# Patient Record
Sex: MALE | Race: WHITE | NOT HISPANIC OR LATINO | Employment: FULL TIME | URBAN - METROPOLITAN AREA
[De-identification: names, ages, dates, MRNs, and addresses within clinical notes are randomized per-mention and may not be internally consistent; named-entity substitution may affect disease eponyms.]

---

## 2021-03-04 NOTE — TELEPHONE ENCOUNTER
DIAGNOSIS: lesion in pelvis, per pt, Dr. Gisella Gay @[facility], imaging done @[facility], referral sent over already   APPOINTMENT DATE: 3.18.21   NOTES STATUS DETAILS   OFFICE NOTE from referring provider In process    OFFICE NOTE from other specialist N/A    DISCHARGE SUMMARY from hospital N/A    DISCHARGE REPORT from the ER N/A    OPERATIVE REPORT N/A    MEDICATION LIST Care Everywhere    EMG (for Spine) N/A    IMPLANT RECORD/STICKER N/A    LABS     CBC/DIFF Care Everywhere    CULTURES N/A    INJECTIONS DONE IN RADIOLOGY N/A    MRI Complete 2.18.21 pelvis   CT SCAN Complete 1.29.21 abd pelvis  1.23.21 abd pelvis   XRAYS (IMAGES & REPORTS) Complete 1.26.21 abd    TUMOR     PATHOLOGY  Slides & report N/A      Action 3.4.21 1:44 PM ADRIÁN   Action Taken Requested records from [facility] [phone]

## 2021-03-18 ENCOUNTER — PRE VISIT (OUTPATIENT)
Dept: ORTHOPEDICS | Facility: CLINIC | Age: 46
End: 2021-03-18

## 2021-03-18 ENCOUNTER — OFFICE VISIT (OUTPATIENT)
Dept: ORTHOPEDICS | Facility: CLINIC | Age: 46
End: 2021-03-18
Payer: COMMERCIAL

## 2021-03-18 VITALS — WEIGHT: 189 LBS | BODY MASS INDEX: 25.6 KG/M2 | HEIGHT: 72 IN

## 2021-03-18 DIAGNOSIS — M89.9 BONE LESION: Primary | ICD-10-CM

## 2021-03-18 PROCEDURE — 99204 OFFICE O/P NEW MOD 45 MIN: CPT | Performed by: ORTHOPAEDIC SURGERY

## 2021-03-18 ASSESSMENT — MIFFLIN-ST. JEOR: SCORE: 1774.81

## 2021-03-18 NOTE — NURSING NOTE
"Chief Complaint   Patient presents with     Consult     pt stated he has a cyst in his pelvis. Dr Gisella Gay referred.        45 year old  1975    Ht 1.82 m (5' 11.65\")   Wt 85.7 kg (189 lb)   BMI 25.88 kg/m      Date of injury:  1. End of January 2021, pt had kidney stones and cyst was found. Further imaging was ordered.       Date/Surgery/Surgeon/Hospital:  1. Appendectomy - 1999 [facility]     Pain Assessment  Patient Currently in Pain: Denies  Primary Pain Location: Hip     [id number] - [place], [place] - [address]      No Known Allergies      Current Outpatient Medications   Medication     NO ACTIVE MEDICATIONS     No current facility-administered medications for this visit.      Questionnaires:    HOOS Hip Dysfunction & Osteoarthritis Outcome Questionnaire    No flowsheet data found.     KOOS Knee Survey Assessment    Knee Outcome Survey ADL Scale (ISAIAS Perez; ALFRED Castellon; Shelley, RS; Claudy, FH; Naveen, CD; 1998) 10/23/2014   Pain (ADLS1) 2   Stiffness (ADLS2) 3   Swelling (ADLS3) 4   Giving Way, Buckling or Shifting of Knee (ADLS4) 5   Weakness (ADLS5) 5   Limping (ADLS6) 5   Walk? (ADLS7) 5   Go up stairs? (ADLS8) 5   Go down stairs? (ADLS9) 5   Stand? (ADLS10) 5   Kneel on the front of your knee? (ADLS11) 5   Squat? (ADLS12) 5   Sit with your knee bent? (ADLS13) 5   How would you rate the current function of your knee during your usual daily activities on a scale from 0 to 100 with 100 being your level of knee function prior to your injury and 0 being the inability to perform any of your usual daily activities? 3   How would you rate the overall function of your knee during your usual daily activities?  (please check the one response that best describes you) 3   As a result of your knee injury, how would you rate your current level of daily activity? (please check the one response that best describes you) -1   Sum 64   Count 14   Raw Score 64 "   Knee Activity of Daily Living Score 91.43      Promis 10 Assessment    No flowsheet data found.     [facility] Knee Questionnaire    No flowsheet data found.           Elena Jose ATC

## 2021-03-18 NOTE — NURSING NOTE
Hill has had surgery previously, denies issues with anesthesia, clotting or bleeding disorders.  Hill stated he will do his preop with his primary physician at [facility].  Hill will reach out to this RN when he is ready to schedule surgery.  Hill expressed that it might be fall.  Hill is aware that this will be a same day procedure and he will need an adult with him for at least 24 hrs following surgery.    Teaching Flowsheet   Relevant Diagnosis: biopsy and probable curettage with allograft packing  Teaching Topic: Preop Teaching for above diagnosis   Person(s) involved in teaching:   Patient     Motivation Level:  Asks Questions: Yes  Eager to Learn: Yes  Cooperative: Yes  Receptive (willing/able to accept information): Yes  Any cultural factors/Hindu beliefs that may influence understanding or compliance? No       Patient demonstrates understanding of the following:  Reason for the appointment, diagnosis and treatment plan: Yes  Knowledge of proper use of medications and conditions for which they are ordered (with special attention to potential side effects or drug interactions): Yes  Which situations necessitate calling provider and whom to contact: Yes       Teaching Concerns Addressed: none    Nutritional needs and diet plan: Yes  Pain management techniques: Yes  Wound Care: Yes  How and/when to access community resources: NA     Instructional Materials Used/Given: Preop Packet and Antiseptic Soap  Time spent with patient: 15 minutes.

## 2021-03-18 NOTE — LETTER
3/18/2021         RE: Hill Archuleta  [address]   [place] [address]        Dear Colleague,    Thank you for referring your patient, Hill Archuleta, to the [facility] [place]. Please see a copy of my visit note below.         [facility], Orthopaedic Oncology Surgery Consultation  by Bob Willis M.D.    Hill Archuleta MRN# [id number]   Age: 45 year old YOB: 1975     Requesting physician: No ref. 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  Likely benign neoplasm and possibility of malignancy is remote.      Plan:  Either serial observation or operative interventional are possible management options.  Given demonstrated enlargement, I advised proceeding with biopsy and probable curettage with allograft packing.  We discussed the risk benefits and alternatives to this procedure.  Expected recovery was also discussed as well.  Can be scheduled on outpatient surgery, 1 hour, [facility], tier 3.    Patient would like to review his personal calendar and indicated desire to proceed with surgery.  He will contact our nurse coordinator Anju in near future to schedule when convenient for him.           History of Present Illness:   45 year old male  chief complaint    Patient presents with a history of a lesion in the left iliac wing identified incidentally when undergoing evaluation for nephrolithiasis.  Patient spontaneously passed stone however osteolytic lesion was identified on CT scan.  Upon review of previous imaging, patient states that MRI in approximately 2012 demonstrated the presence of a mass.  This was completed at CT scan.  No intervention was undertaken at that time.  He subsequently had a follow-up CT scan in 2017 according to records from [facility]  [facility] in 2017.  The next imaging study was performed more recently in January of this year and demonstrated the enlargement in size compared to prior imaging.    Patient denies any symptoms and no history of trauma.  He has no constitutional symptoms of fever weight loss or appetite change.  He is otherwise healthy.  No prior history of malignancy.  No history of prior surgery either.               Physical Exam:     EXAMINATION pertinent findings:   PSYCH: Pleasant, healthy-appearing, alert, oriented x3, cooperative. Normal mood and affect.  VITAL SIGNS: Height 1.82 m (5' 11.65\"), weight 85.7 kg (189 lb)..  Reviewed nursing intake notes.   Body mass index is 25.88 kg/m .  RESP: non labored breathing   ABD: benign, soft, non-tender, no acute peritoneal findings  SKIN: grossly normal   LYMPHATIC: grossly normal, no adenopathy, no extremity edema  NEURO: grossly normal , no motor deficits.  VASCULAR: satisfactory perfusion of all extremities   MUSCULOSKELETAL:   Gait is normal.  Able to perform single-leg stance and hop on one leg bilaterally.    Full symmetric range of motion of both hip joints.  Knee examination is benign bilaterally.  No tenderness over the posterior iliac crest bilaterally.  No pain with either compression or distraction.             Data:   All laboratory data reviewed  All imaging studies reviewed by me          DATA for DOCUMENTATION:         Past Medical History:     Patient Active Problem List   Diagnosis   (none) - all problems resolved or deleted     No past medical history on file.    Also see scanned health assessment forms.       Past Surgical History:   No past surgical history on file.         Social History:     Social History     Socioeconomic History     Marital status:      Spouse name: Not on file     Number of children: Not on file     Years of education: Not on file     Highest education level: Not on file   Occupational History     Not on file   Social Needs     " Financial resource strain: Not on file     Food insecurity     Worry: Not on file     Inability: Not on file     Transportation needs     Medical: Not on file     Non-medical: Not on file   Tobacco Use     Smoking status: Never Smoker     Smokeless tobacco: Never Used   Substance and Sexual Activity     Alcohol use: Not on file     Drug use: Not on file     Sexual activity: Not on file   Lifestyle     Physical activity     Days per week: Not on file     Minutes per session: Not on file     Stress: Not on file   Relationships     Social connections     Talks on phone: Not on file     Gets together: Not on file     Attends [employer] service: Not on file     Active member of club or organization: Not on file     Attends meetings of clubs or organizations: Not on file     Relationship status: Not on file     Intimate partner violence     Fear of current or ex partner: Not on file     Emotionally abused: Not on file     Physically abused: Not on file     Forced sexual activity: Not on file   Other Topics Concern     Not on file   Social History Narrative     Not on file            Family History:     No family history on file.         Medications:     Current Outpatient Medications   Medication Sig     NO ACTIVE MEDICATIONS      No current facility-administered medications for this visit.               Review of Systems:   A comprehensive 10 point review of systems (constitutional, ENT, cardiac, peripheral vascular, lymphatic, respiratory, GI, , Musculoskeletal, skin, Neurological) was performed and found to be negative except as described in this note.     See intake form completed by patient

## 2021-03-18 NOTE — PROGRESS NOTES
[facility], Orthopaedic Oncology Surgery Consultation  by Bob Willis M.D.    Hill Archuleta MRN# [id number]   Age: 45 year old YOB: 1975     Requesting physician: No ref. 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  Likely benign neoplasm and possibility of malignancy is remote.      Plan:  Either serial observation or operative interventional are possible management options.  Given demonstrated enlargement, I advised proceeding with biopsy and probable curettage with allograft packing.  We discussed the risk benefits and alternatives to this procedure.  Expected recovery was also discussed as well.  Can be scheduled on outpatient surgery, 1 hour, [facility], tier 3.    Patient would like to review his personal calendar and indicated desire to proceed with surgery.  He will contact our nurse coordinator Anju in near future to schedule when convenient for him.           History of Present Illness:   45 year old male  chief complaint    Patient presents with a history of a lesion in the left iliac wing identified incidentally when undergoing evaluation for nephrolithiasis.  Patient spontaneously passed stone however osteolytic lesion was identified on CT scan.  Upon review of previous imaging, patient states that MRI in approximately 2012 demonstrated the presence of a mass.  This was completed at CT scan.  No intervention was undertaken at that time.  He subsequently had a follow-up CT scan in 2017 according to records from [facility] in 2017.  The next imaging study was performed more recently in January of this year and demonstrated the enlargement in size compared to prior imaging.    Patient denies any symptoms and no history of trauma.  He has no constitutional symptoms of fever weight  "loss or appetite change.  He is otherwise healthy.  No prior history of malignancy.  No history of prior surgery either.               Physical Exam:     EXAMINATION pertinent findings:   PSYCH: Pleasant, healthy-appearing, alert, oriented x3, cooperative. Normal mood and affect.  VITAL SIGNS: Height 1.82 m (5' 11.65\"), weight 85.7 kg (189 lb)..  Reviewed nursing intake notes.   Body mass index is 25.88 kg/m .  RESP: non labored breathing   ABD: benign, soft, non-tender, no acute peritoneal findings  SKIN: grossly normal   LYMPHATIC: grossly normal, no adenopathy, no extremity edema  NEURO: grossly normal , no motor deficits.  VASCULAR: satisfactory perfusion of all extremities   MUSCULOSKELETAL:   Gait is normal.  Able to perform single-leg stance and hop on one leg bilaterally.    Full symmetric range of motion of both hip joints.  Knee examination is benign bilaterally.  No tenderness over the posterior iliac crest bilaterally.  No pain with either compression or distraction.             Data:   All laboratory data reviewed  All imaging studies reviewed by me          DATA for DOCUMENTATION:         Past Medical History:     Patient Active Problem List   Diagnosis   (none) - all problems resolved or deleted     No past medical history on file.    Also see scanned health assessment forms.       Past Surgical History:   No past surgical history on file.         Social History:     Social History     Socioeconomic History     Marital status:      Spouse name: Not on file     Number of children: Not on file     Years of education: Not on file     Highest education level: Not on file   Occupational History     Not on file   Social Needs     Financial resource strain: Not on file     Food insecurity     Worry: Not on file     Inability: Not on file     Transportation needs     Medical: Not on file     Non-medical: Not on file   Tobacco Use     Smoking status: Never Smoker     Smokeless tobacco: Never Used "   Substance and Sexual Activity     Alcohol use: Not on file     Drug use: Not on file     Sexual activity: Not on file   Lifestyle     Physical activity     Days per week: Not on file     Minutes per session: Not on file     Stress: Not on file   Relationships     Social connections     Talks on phone: Not on file     Gets together: Not on file     Attends Faith service: Not on file     Active member of club or organization: Not on file     Attends meetings of clubs or organizations: Not on file     Relationship status: Not on file     Intimate partner violence     Fear of current or ex partner: Not on file     Emotionally abused: Not on file     Physically abused: Not on file     Forced sexual activity: Not on file   Other Topics Concern     Not on file   Social History Narrative     Not on file            Family History:     No family history on file.         Medications:     Current Outpatient Medications   Medication Sig     NO ACTIVE MEDICATIONS      No current facility-administered medications for this visit.               Review of Systems:   A comprehensive 10 point review of systems (constitutional, ENT, cardiac, peripheral vascular, lymphatic, respiratory, GI, , Musculoskeletal, skin, Neurological) was performed and found to be negative except as described in this note.     See intake form completed by patient

## 2021-05-08 ENCOUNTER — HEALTH MAINTENANCE LETTER (OUTPATIENT)
Age: 46
End: 2021-05-08

## 2021-08-13 ENCOUNTER — TELEPHONE (OUTPATIENT)
Dept: ORTHOPEDICS | Facility: CLINIC | Age: 46
End: 2021-08-13

## 2021-08-13 ENCOUNTER — PREP FOR PROCEDURE (OUTPATIENT)
Dept: ORTHOPEDICS | Facility: CLINIC | Age: 46
End: 2021-08-13

## 2021-08-13 DIAGNOSIS — M89.9 BONE LESION: Primary | ICD-10-CM

## 2021-08-13 NOTE — TELEPHONE ENCOUNTER
Returned call to patient regarding scheduling surgery with Dr Willis. I left him my direct number to call back at his convenience. [phone]

## 2021-08-13 NOTE — TELEPHONE ENCOUNTER
Patient is scheduled for surgery with Dr. Willis    Spoke with: Patient    Date of Surgery: 11/2/21    Location: [place]    Post op: 2 weeks    Pre op with Provider: Complete    H&P: Patient will schedule with PCP    Pre-procedure COVID-19 Test: Patient will wait for call to schedule    Additional imaging/appointments: N/A    Surgery packet: Mailed to patient today     Additional comments: N/A

## 2021-08-13 NOTE — TELEPHONE ENCOUNTER
Message left for Hill to call this RN back to discuss surgical recovery, surgery scheduled for Nov.  This RN did offer a video/phone visit with Dr. Vitale if that would be helpful , since he has not spoken to Dr. Vitale since March,    OLIVA JansenN, RN  RN Care Coordinator, Dr. Vitale  [facility]

## 2021-08-31 RX ORDER — ACETAMINOPHEN 325 MG/1
975 TABLET ORAL ONCE
Status: CANCELLED | OUTPATIENT
Start: 2021-08-31 | End: 2021-08-31

## 2021-08-31 RX ORDER — CEFAZOLIN SODIUM 2 G/100ML
2 INJECTION, SOLUTION INTRAVENOUS SEE ADMIN INSTRUCTIONS
Status: CANCELLED | OUTPATIENT
Start: 2021-08-31

## 2021-08-31 RX ORDER — CEFAZOLIN SODIUM 2 G/100ML
2 INJECTION, SOLUTION INTRAVENOUS
Status: CANCELLED | OUTPATIENT
Start: 2021-08-31

## 2021-10-05 DIAGNOSIS — Z11.59 ENCOUNTER FOR SCREENING FOR OTHER VIRAL DISEASES: ICD-10-CM

## 2021-10-19 ENCOUNTER — TRANSFERRED RECORDS (OUTPATIENT)
Dept: HEALTH INFORMATION MANAGEMENT | Facility: CLINIC | Age: 46
End: 2021-10-19

## 2021-10-19 LAB
CREATININE (EXTERNAL): 1.11 MG/DL (ref 0.73–1.18)
GFR ESTIMATED (EXTERNAL): >60 ML/MIN/1.73M2
GLUCOSE (EXTERNAL): 90 MG/DL (ref 70–100)
POTASSIUM (EXTERNAL): 4.3 MMOL/L (ref 3.5–5.1)

## 2021-10-26 NOTE — PROGRESS NOTES
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  Likely benign neoplasm and possibility of malignancy is remote.    Either serial observation or operative interventional are possible management options.  Given demonstrated enlargement, I advised proceeding with biopsy and probable curettage with allograft packing.  We discussed the risk benefits and alternatives to this procedure.  Expected recovery was also discussed as well.  Can be scheduled on outpatient surgery, 1 hour, [facility], tier 3.\"    Patient Position (indicated by x):     Supine     Supine with torso rolled up on a bump      Floppy lateral on torso length bean bag      Lateral decubitus, bean bag, full length      Lateral decubitus, [place]son hip positioner      Safety paddle side supports x 2 clamped to side rail      Lithotomy, both legs in yellow padded leg leal      Lithotomy, single leg in yellow padded leg leal      Prone on blanket rolls/round gel pad    x  Prone on Gregory (arched) frame on Ralf table      Single thigh in orange arthroscopy clamp      Beach chair semi recumbent      Arm out on radiolucent arm table    x  Split drape with top bar      Revision NELLA drape with plastic side bags for leg     Extremity drape      Shoulder pack drape      Chao catheter            Fracture Table   x  Ralf x-ray table     Regular OR table              General Equipment Requests (indicated by x):    x   C-Arm with C-Armor drape      O-Arm with Stealth imaging      Lazaro Biopsy trephine set w/ K-wire & pituitary rongeurs   x  Small pituitary rongeur    x  Lazaro's angled curettes, narrow shaft    x  Bone graft, " emilie Tiwari Bandar Medtronic reji, electric motor      Phenol 5%      [place] BMAC stem cell      Vancomycin 1 gram powder      Zometa 4 mg vials      Depo Medrol steroid    x  Blunt Pelvic Retractor (.55, Blunt Hohmann with  slight bend)      (1) Portable hand held radiation detector machine for sentinel node biopsy and (2) Lymphazurin    x  [place] Osteotomes      Specimens and cultures (indicated by x):      Tissue cultures, aerobic and anaerobic without gram stain     Frozen section   x  pathology specimens - fresh      pathology specimens - formalin                Ton Lee DO  Adult Joint Reconstruction Fellow  Dept Orthopaedic Surgery, [facility]

## 2021-10-27 ENCOUNTER — TELEPHONE (OUTPATIENT)
Dept: ORTHOPEDICS | Facility: CLINIC | Age: 46
End: 2021-10-27

## 2021-10-27 DIAGNOSIS — M89.9 BONE LESION: Primary | ICD-10-CM

## 2021-10-27 NOTE — TELEPHONE ENCOUNTER
Message left for Hill that Dr. Willis needs an updated MRI before proceeding with surgery next week.  This RN was able to get him on the MRI schedule tomorrow at 115pm, 1pm [facility] and then he could come upstairs following to see Dr. Willis in person to confirm the surgical plan.  This RN asked Hill to call back and confirm.    OLIVA JansenN, RN  RN Care Coordinator, Dr. Willis  [facility]

## 2021-10-27 NOTE — TELEPHONE ENCOUNTER
Returned call to Hill after voicemail left for this RN.  Confirmed MRI tomorrow and then visit with Dr. Willis now in person rather that phone.  This RN discussed that we can see his recent Cr in [facility] so he should not require any labs.    Hill verbalized understanding of adding the MRI tomorrow and confirming the surgical plan with Dr. Willis.    All questions answered.    SILVIA Jansen, RN  RN Care Coordinator, Dr. Willis  [facility]

## 2021-10-28 ENCOUNTER — OFFICE VISIT (OUTPATIENT)
Dept: ORTHOPEDICS | Facility: CLINIC | Age: 46
End: 2021-10-28
Payer: COMMERCIAL

## 2021-10-28 ENCOUNTER — ANCILLARY PROCEDURE (OUTPATIENT)
Dept: MRI IMAGING | Facility: CLINIC | Age: 46
End: 2021-10-28
Attending: ORTHOPAEDIC SURGERY
Payer: COMMERCIAL

## 2021-10-28 VITALS — HEIGHT: 72 IN | BODY MASS INDEX: 25.06 KG/M2 | WEIGHT: 185 LBS

## 2021-10-28 DIAGNOSIS — M89.9 BONE LESION: ICD-10-CM

## 2021-10-28 DIAGNOSIS — M89.9 BONE LESION: Primary | ICD-10-CM

## 2021-10-28 PROCEDURE — 72197 MRI PELVIS W/O & W/DYE: CPT | Performed by: RADIOLOGY

## 2021-10-28 PROCEDURE — A9585 GADOBUTROL INJECTION: HCPCS | Performed by: RADIOLOGY

## 2021-10-28 PROCEDURE — 99214 OFFICE O/P EST MOD 30 MIN: CPT | Performed by: ORTHOPAEDIC SURGERY

## 2021-10-28 RX ORDER — GADOBUTROL 604.72 MG/ML
10 INJECTION INTRAVENOUS ONCE
Status: COMPLETED | OUTPATIENT
Start: 2021-10-28 | End: 2021-10-28

## 2021-10-28 RX ADMIN — GADOBUTROL 8.5 ML: 604.72 INJECTION INTRAVENOUS at 13:55

## 2021-10-28 ASSESSMENT — MIFFLIN-ST. JEOR: SCORE: 1757.15

## 2021-10-28 NOTE — LETTER
10/28/2021         RE: Hill Archuleta  [address]   [place] [address]        Dear Colleague,    Thank you for referring your patient, Hill Archuleta, to the [facility] [place]. Please see a copy of my visit note below.         [facility], Orthopaedic Oncology Surgery Consultation  by Bob Willis M.D.    Hill Archuleta MRN# [id number]   Age: 45 year old YOB: 1975     Requesting physician: No ref. 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  Likely benign neoplasm and possibility of malignancy is remote.      Plan:  Previously discussed both nonoperative and surgical management for this lesion. Presently patient is scheduled for outpatient surgery, biopsy with intralesional curettage and allograft bone packing, 1 hour, [facility], tier 3.    We again reviewed his expected recovery and resumption of both daily activities as well as athletic activities.    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  Patient spontaneously passed stone however osteolytic lesion was identified on CT scan.  Upon review of previous imaging, patient states that MRI in approximately 2012 demonstrated the presence of a mass.  This was completed at CT scan.  No intervention was undertaken at that time.  He subsequently had a follow-up  CT scan in 2017 according to records from [facility] in 2017.  The next imaging study was performed more recently in January of this year and demonstrated the enlargement in size compared to prior imaging.    Patient denies any symptoms and no history of trauma.  He has no constitutional symptoms of fever weight loss or appetite change.  He is otherwise healthy.  No prior history of malignancy.  No history of prior surgery either.           Physical Exam:     EXAMINATION deferred           Data:   All laboratory data reviewed  All imaging studies reviewed by me    MRI examination performed today and there is no evidence of significant change compared to the previous study of February 2021.                Questionnaires:    Ortho Intake Patient Questionnaire    Ortho Intake (Tumor Intake) 10/27/2021   Please briefly describe the problem/condition for which you are seeking care today. Incidental bone cyst   How did you notice the problem/condition? Incidental finding on imaging   Does your pain wake you up from sleeping? No   Do you have any other joints or sites that are painful? No   Was this problem related to an injury? No   When did your pain begin? 10/27/2021   Does any member of your family have a history of bone or soft tissue tumors?  If so, what kind? Father had benign aneurysmal bone cyst in left iliac wing removed in 40s.   Does any member of your family have a history of cancer?  If so, what kind? No        Promis 10 Assessment    PROMIS 10 10/27/2021   In general, would you say your health is: Excellent   In general, would you say your quality of life is: Excellent   In general, how would you rate your physical health? Excellent   In general, how would you rate your mental health, including your mood and your ability to think? Excellent   In general, how would you rate your satisfaction with your social activities and relationships? Excellent   In general, please rate how well you  carry out your usual social activities and roles Excellent   To what extent are you able to carry out your everyday physical activities such as walking, climbing stairs, carrying groceries, or moving a chair? Completely   How often have you been bothered by emotional problems such as feeling anxious, depressed or irritable? Rarely   How would you rate your fatigue on average? None   How would you rate your pain on average?   0 = No Pain  to  10 = Worst Imaginable Pain 0   In general, would you say your health is: 5   In general, would you say your quality of life is: 5   In general, how would you rate your physical health? 5   In general, how would you rate your mental health, including your mood and your ability to think? 5   In general, how would you rate your satisfaction with your social activities and relationships? 5   In general, please rate how well you carry out your usual social activities and roles. (This includes activities at home, at work and in your community, and responsibilities as a parent, child, spouse, employee, friend, etc.) 5   To what extent are you able to carry out your everyday physical activities such as walking, climbing stairs, carrying groceries, or moving a chair? 5   In the past 7 days, how often have you been bothered by emotional problems such as feeling anxious, depressed, or irritable? 2   In the past 7 days, how would you rate your fatigue on average? 1   In the past 7 days, how would you rate your pain on average, where 0 means no pain, and 10 means worst imaginable pain? 0   Global Mental Health Score 19   Global Physical Health Score 20   PROMIS TOTAL - SUBSCORES 39   Some recent data might be hidden

## 2021-10-28 NOTE — PROGRESS NOTES
[facility], Orthopaedic Oncology Surgery Consultation  by Bob Willis M.D.    Hill Archuleta MRN# [id number]   Age: 45 year old YOB: 1975     Requesting physician: No ref. 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  Likely benign neoplasm and possibility of malignancy is remote.      Plan:  Previously discussed both nonoperative and surgical management for this lesion. Presently patient is scheduled for outpatient surgery, biopsy with intralesional curettage and allograft bone packing, 1 hour, [facility], tier 3.    We again reviewed his expected recovery and resumption of both daily activities as well as athletic activities.    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  Patient spontaneously passed stone however osteolytic lesion was identified on CT scan.  Upon review of previous imaging, patient states that MRI in approximately 2012 demonstrated the presence of a mass.  This was completed at CT scan.  No intervention was undertaken at that time.  He subsequently had a follow-up CT scan in 2017 according to records from [facility] in 2017.  The next imaging study was performed more recently in January of this year and demonstrated the enlargement in size compared to prior imaging.    Patient denies any symptoms and no history of  trauma.  He has no constitutional symptoms of fever weight loss or appetite change.  He is otherwise healthy.  No prior history of malignancy.  No history of prior surgery either.           Physical Exam:     EXAMINATION deferred           Data:   All laboratory data reviewed  All imaging studies reviewed by me    MRI examination performed today and there is no evidence of significant change compared to the previous study of February 2021.                Questionnaires:    Ortho Intake Patient Questionnaire    Ortho Intake (Tumor Intake) 10/27/2021   Please briefly describe the problem/condition for which you are seeking care today. Incidental bone cyst   How did you notice the problem/condition? Incidental finding on imaging   Does your pain wake you up from sleeping? No   Do you have any other joints or sites that are painful? No   Was this problem related to an injury? No   When did your pain begin? 10/27/2021   Does any member of your family have a history of bone or soft tissue tumors?  If so, what kind? Father had benign aneurysmal bone cyst in left iliac wing removed in 40s.   Does any member of your family have a history of cancer?  If so, what kind? No        Promis 10 Assessment    PROMIS 10 10/27/2021   In general, would you say your health is: Excellent   In general, would you say your quality of life is: Excellent   In general, how would you rate your physical health? Excellent   In general, how would you rate your mental health, including your mood and your ability to think? Excellent   In general, how would you rate your satisfaction with your social activities and relationships? Excellent   In general, please rate how well you carry out your usual social activities and roles Excellent   To what extent are you able to carry out your everyday physical activities such as walking, climbing stairs, carrying groceries, or moving a chair? Completely   How often have you been bothered by emotional problems  such as feeling anxious, depressed or irritable? Rarely   How would you rate your fatigue on average? None   How would you rate your pain on average?   0 = No Pain  to  10 = Worst Imaginable Pain 0   In general, would you say your health is: 5   In general, would you say your quality of life is: 5   In general, how would you rate your physical health? 5   In general, how would you rate your mental health, including your mood and your ability to think? 5   In general, how would you rate your satisfaction with your social activities and relationships? 5   In general, please rate how well you carry out your usual social activities and roles. (This includes activities at home, at work and in your community, and responsibilities as a parent, child, spouse, employee, friend, etc.) 5   To what extent are you able to carry out your everyday physical activities such as walking, climbing stairs, carrying groceries, or moving a chair? 5   In the past 7 days, how often have you been bothered by emotional problems such as feeling anxious, depressed, or irritable? 2   In the past 7 days, how would you rate your fatigue on average? 1   In the past 7 days, how would you rate your pain on average, where 0 means no pain, and 10 means worst imaginable pain? 0   Global Mental Health Score 19   Global Physical Health Score 20   PROMIS TOTAL - SUBSCORES 39   Some recent data might be hidden

## 2021-10-28 NOTE — NURSING NOTE
Chief Complaint   Patient presents with     RECHECK     Discuss questions prior to upcoming surgery POP biopsy left posterior iliac wing //  DOS: 11/2/21       46 year old  1975    Ht 1.829 m (6')   Wt 83.9 kg (185 lb)   BMI 25.09 kg/m        Date/Surgery/Surgeon/Hospital:  1. No past surgical history on file.           Pain Assessment  Patient Currently in Pain: No        [id number] - [place], [place] - [address] AT  [address] [facility]  [id number] - [place], [place] - [address] AT [facility] & [address] - [address]  [id number] - [place], [place] - [address] AT [address] [place] [address] & [facility]      No Known Allergies    Current Outpatient Medications   Medication     NO ACTIVE MEDICATIONS     No current facility-administered medications for this visit.           Questionnaires:    Ortho Intake Patient Questionnaire    Ortho Intake (Tumor Intake) 10/27/2021   Please briefly describe the problem/condition for which you are seeking care today. Incidental bone cyst   How did you notice the problem/condition? Incidental finding on imaging   Does your pain wake you up from sleeping? No   Do you have any other joints or sites that are painful? No   Was this problem related to an injury? No   When did your pain begin? 10/27/2021   Does any member of your family have a history of bone or soft tissue tumors?  If so, what kind? Father had benign aneurysmal bone cyst in left iliac wing removed in 40s.   Does any member of your family have a history of cancer?  If so, what kind? No          Promis 10 Assessment    PROMIS 10 10/27/2021   In general, would you say your health is: Excellent   In general, would you say your quality of life is: Excellent   In general, how would you rate your physical health? Excellent   In general, how would you rate your mental health, including your mood and your  ability to think? Excellent   In general, how would you rate your satisfaction with your social activities and relationships? Excellent   In general, please rate how well you carry out your usual social activities and roles Excellent   To what extent are you able to carry out your everyday physical activities such as walking, climbing stairs, carrying groceries, or moving a chair? Completely   How often have you been bothered by emotional problems such as feeling anxious, depressed or irritable? Rarely   How would you rate your fatigue on average? None   How would you rate your pain on average?   0 = No Pain  to  10 = Worst Imaginable Pain 0   In general, would you say your health is: 5   In general, would you say your quality of life is: 5   In general, how would you rate your physical health? 5   In general, how would you rate your mental health, including your mood and your ability to think? 5   In general, how would you rate your satisfaction with your social activities and relationships? 5   In general, please rate how well you carry out your usual social activities and roles. (This includes activities at home, at work and in your community, and responsibilities as a parent, child, spouse, employee, friend, etc.) 5   To what extent are you able to carry out your everyday physical activities such as walking, climbing stairs, carrying groceries, or moving a chair? 5   In the past 7 days, how often have you been bothered by emotional problems such as feeling anxious, depressed, or irritable? 2   In the past 7 days, how would you rate your fatigue on average? 1   In the past 7 days, how would you rate your pain on average, where 0 means no pain, and 10 means worst imaginable pain? 0   Global Mental Health Score 19   Global Physical Health Score 20   PROMIS TOTAL - SUBSCORES 39   Some recent data might be hidden

## 2021-10-30 ENCOUNTER — LAB (OUTPATIENT)
Dept: URGENT CARE | Facility: URGENT CARE | Age: 46
End: 2021-10-30
Payer: COMMERCIAL

## 2021-10-30 DIAGNOSIS — Z11.59 ENCOUNTER FOR SCREENING FOR OTHER VIRAL DISEASES: ICD-10-CM

## 2021-10-30 LAB — SARS-COV-2 RNA RESP QL NAA+PROBE: NEGATIVE

## 2021-10-30 PROCEDURE — U0005 INFEC AGEN DETEC AMPLI PROBE: HCPCS

## 2021-10-30 PROCEDURE — U0003 INFECTIOUS AGENT DETECTION BY NUCLEIC ACID (DNA OR RNA); SEVERE ACUTE RESPIRATORY SYNDROME CORONAVIRUS 2 (SARS-COV-2) (CORONAVIRUS DISEASE [COVID-19]), AMPLIFIED PROBE TECHNIQUE, MAKING USE OF HIGH THROUGHPUT TECHNOLOGIES AS DESCRIBED BY CMS-2020-01-R: HCPCS

## 2021-11-01 ENCOUNTER — ANESTHESIA EVENT (OUTPATIENT)
Dept: SURGERY | Facility: CLINIC | Age: 46
End: 2021-11-01
Payer: COMMERCIAL

## 2021-11-01 NOTE — ANESTHESIA PREPROCEDURE EVALUATION
Anesthesia Pre-Procedure Evaluation    Patient: Hill Archuleta   MRN: [id number] : 1975        Preoperative Diagnosis: Bone lesion [M89.9]    Procedure : Procedure(s):  Biopsy Left posterior iliac wing lesion,with probable curettage and allograft bone packing          History reviewed. No pertinent past medical history.   Past Surgical History:   Procedure Laterality Date     APPENDECTOMY       GENITOURINARY SURGERY      Urethral stricture repair       No Known Allergies   Social History     Tobacco Use     Smoking status: Never Smoker     Smokeless tobacco: Never Used   Substance Use Topics     Alcohol use: Yes      Wt Readings from Last 1 Encounters:   10/28/21 83.9 kg (185 lb)        Anesthesia Evaluation   Pt has not had prior anesthetic         ROS/MED HX  ENT/Pulmonary:  - neg pulmonary ROS     Neurologic:  - neg neurologic ROS     Cardiovascular:  - neg cardiovascular ROS     METS/Exercise Tolerance: >4 METS    Hematologic:  - neg hematologic  ROS     Musculoskeletal: Comment: lytic lesion in left iliac wing      GI/Hepatic:  - neg GI/hepatic ROS     Renal/Genitourinary:  - neg Renal ROS     Endo:  - neg endo ROS     Psychiatric/Substance Use:  - neg psychiatric ROS     Infectious Disease:  - neg infectious disease ROS     Malignancy:  - neg malignancy ROS     Other:  - neg other ROS          Physical Exam    Airway  airway exam normal      Mallampati: I   TM distance: > 3 FB   Neck ROM: full   Mouth opening: > 3 cm    Respiratory Devices and Support         Dental  no notable dental history         Cardiovascular   cardiovascular exam normal       Rhythm and rate: regular and normal     Pulmonary   pulmonary exam normal                OUTSIDE LABS:  CBC: No results found for: WBC, HGB, HCT, PLT  BMP:   Lab Results   Component Value Date     2012    POTASSIUM 4.2 2012    CHLORIDE 104 2012    CO2 23 2012    BUN 15 2012    CR 0.91 2012    CR 0.94  08/23/2012    GLC 83 12/03/2012     COAGS: No results found for: PTT, INR, FIBR  POC: No results found for: BGM, HCG, HCGS  HEPATIC: No results found for: ALBUMIN, PROTTOTAL, ALT, AST, GGT, ALKPHOS, BILITOTAL, BILIDIRECT, PRICE  OTHER:   Lab Results   Component Value Date    HIRA 9.5 12/03/2012    SED 3 08/23/2012       Anesthesia Plan    ASA Status:  1   NPO Status:  NPO Appropriate    Anesthesia Type: General.     - Airway: ETT   Induction: Intravenous.   Maintenance: Inhalation.   Techniques and Equipment:       - Drips/Meds: Ketamine     Consents    Anesthesia Plan(s) and associated risks, benefits, and realistic alternatives discussed. Questions answered and patient/representative(s) expressed understanding.     - Discussed with:  Patient      - Specific Concerns: PONV, sore throat, airway injury, major complications.     - Extended Intubation/Ventilatory Support Discussed: No.      - Patient is DNR/DNI Status: No    Use of blood products discussed: No .     Postoperative Care    Pain management: Multi-modal analgesia, IV analgesics, Oral pain medications.   PONV prophylaxis: Ondansetron (or other 5HT-3), Dexamethasone or Solumedrol     Comments:                Leslie Goldberg, MD

## 2021-11-02 ENCOUNTER — ANESTHESIA (OUTPATIENT)
Dept: SURGERY | Facility: CLINIC | Age: 46
End: 2021-11-02
Payer: COMMERCIAL

## 2021-11-02 ENCOUNTER — APPOINTMENT (OUTPATIENT)
Dept: GENERAL RADIOLOGY | Facility: CLINIC | Age: 46
End: 2021-11-02
Attending: ORTHOPAEDIC SURGERY
Payer: COMMERCIAL

## 2021-11-02 ENCOUNTER — HOSPITAL ENCOUNTER (OUTPATIENT)
Facility: CLINIC | Age: 46
Discharge: HOME | End: 2021-11-02
Attending: ORTHOPAEDIC SURGERY | Admitting: ORTHOPAEDIC SURGERY
Payer: COMMERCIAL

## 2021-11-02 VITALS
WEIGHT: 192.24 LBS | TEMPERATURE: 97.5 F | HEIGHT: 72 IN | RESPIRATION RATE: 12 BRPM | HEART RATE: 62 BPM | BODY MASS INDEX: 26.04 KG/M2 | DIASTOLIC BLOOD PRESSURE: 75 MMHG | OXYGEN SATURATION: 96 % | SYSTOLIC BLOOD PRESSURE: 139 MMHG

## 2021-11-02 DIAGNOSIS — M89.9 BONE LESION: ICD-10-CM

## 2021-11-02 LAB — GLUCOSE BLDC GLUCOMTR-MCNC: 93 MG/DL (ref 70–99)

## 2021-11-02 PROCEDURE — 370N000017 HC ANESTHESIA TECHNICAL FEE, PER MIN: Performed by: ORTHOPAEDIC SURGERY

## 2021-11-02 PROCEDURE — 258N000003 HC RX IP 258 OP 636: Performed by: STUDENT IN AN ORGANIZED HEALTH CARE EDUCATION/TRAINING PROGRAM

## 2021-11-02 PROCEDURE — 250N000011 HC RX IP 250 OP 636: Performed by: STUDENT IN AN ORGANIZED HEALTH CARE EDUCATION/TRAINING PROGRAM

## 2021-11-02 PROCEDURE — 87075 CULTR BACTERIA EXCEPT BLOOD: CPT | Performed by: ORTHOPAEDIC SURGERY

## 2021-11-02 PROCEDURE — 710N000009 HC RECOVERY PHASE 1, LEVEL 1, PER MIN: Performed by: ORTHOPAEDIC SURGERY

## 2021-11-02 PROCEDURE — 88311 DECALCIFY TISSUE: CPT | Mod: TC | Performed by: ORTHOPAEDIC SURGERY

## 2021-11-02 PROCEDURE — 250N000009 HC RX 250: Performed by: ORTHOPAEDIC SURGERY

## 2021-11-02 PROCEDURE — 88311 DECALCIFY TISSUE: CPT | Mod: 26 | Performed by: PATHOLOGY

## 2021-11-02 PROCEDURE — 999N000063 XR PELVIS PORT 1/2 VIEWS

## 2021-11-02 PROCEDURE — 258N000003 HC RX IP 258 OP 636: Performed by: ORTHOPAEDIC SURGERY

## 2021-11-02 PROCEDURE — 82962 GLUCOSE BLOOD TEST: CPT

## 2021-11-02 PROCEDURE — 250N000011 HC RX IP 250 OP 636: Performed by: ORTHOPAEDIC SURGERY

## 2021-11-02 PROCEDURE — 250N000011 HC RX IP 250 OP 636: Performed by: PHYSICIAN ASSISTANT

## 2021-11-02 PROCEDURE — 250N000009 HC RX 250: Performed by: STUDENT IN AN ORGANIZED HEALTH CARE EDUCATION/TRAINING PROGRAM

## 2021-11-02 PROCEDURE — 72170 X-RAY EXAM OF PELVIS: CPT | Mod: 26 | Performed by: RADIOLOGY

## 2021-11-02 PROCEDURE — 710N000012 HC RECOVERY PHASE 2, PER MINUTE: Performed by: ORTHOPAEDIC SURGERY

## 2021-11-02 PROCEDURE — 272N000001 HC OR GENERAL SUPPLY STERILE: Performed by: ORTHOPAEDIC SURGERY

## 2021-11-02 PROCEDURE — 88305 TISSUE EXAM BY PATHOLOGIST: CPT | Mod: 26 | Performed by: PATHOLOGY

## 2021-11-02 PROCEDURE — 360N000076 HC SURGERY LEVEL 3, PER MIN: Performed by: ORTHOPAEDIC SURGERY

## 2021-11-02 PROCEDURE — 87070 CULTURE OTHR SPECIMN AEROBIC: CPT | Performed by: ORTHOPAEDIC SURGERY

## 2021-11-02 PROCEDURE — 250N000025 HC SEVOFLURANE, PER MIN: Performed by: ORTHOPAEDIC SURGERY

## 2021-11-02 PROCEDURE — C1762 CONN TISS, HUMAN(INC FASCIA): HCPCS | Performed by: ORTHOPAEDIC SURGERY

## 2021-11-02 PROCEDURE — 27066 REMOVE HIP BONE LES DEEP: CPT | Mod: LT | Performed by: ORTHOPAEDIC SURGERY

## 2021-11-02 PROCEDURE — 250N000013 HC RX MED GY IP 250 OP 250 PS 637: Performed by: STUDENT IN AN ORGANIZED HEALTH CARE EDUCATION/TRAINING PROGRAM

## 2021-11-02 PROCEDURE — 999N000141 HC STATISTIC PRE-PROCEDURE NURSING ASSESSMENT: Performed by: ORTHOPAEDIC SURGERY

## 2021-11-02 DEVICE — GRAFT BONE CRUSH CANC 30ML 400080: Type: IMPLANTABLE DEVICE | Site: PELVIS | Status: FUNCTIONAL

## 2021-11-02 RX ORDER — ONDANSETRON 4 MG/1
4 TABLET, ORALLY DISINTEGRATING ORAL EVERY 30 MIN PRN
Status: DISCONTINUED | OUTPATIENT
Start: 2021-11-02 | End: 2021-11-02

## 2021-11-02 RX ORDER — HYDRALAZINE HYDROCHLORIDE 20 MG/ML
2.5-5 INJECTION INTRAMUSCULAR; INTRAVENOUS EVERY 10 MIN PRN
Status: DISCONTINUED | OUTPATIENT
Start: 2021-11-02 | End: 2021-11-02 | Stop reason: HOSPADM

## 2021-11-02 RX ORDER — SODIUM CHLORIDE, SODIUM LACTATE, POTASSIUM CHLORIDE, CALCIUM CHLORIDE 600; 310; 30; 20 MG/100ML; MG/100ML; MG/100ML; MG/100ML
INJECTION, SOLUTION INTRAVENOUS CONTINUOUS PRN
Status: DISCONTINUED | OUTPATIENT
Start: 2021-11-02 | End: 2021-11-02

## 2021-11-02 RX ORDER — KETAMINE HYDROCHLORIDE 10 MG/ML
INJECTION INTRAMUSCULAR; INTRAVENOUS PRN
Status: DISCONTINUED | OUTPATIENT
Start: 2021-11-02 | End: 2021-11-02

## 2021-11-02 RX ORDER — FENTANYL CITRATE 50 UG/ML
INJECTION, SOLUTION INTRAMUSCULAR; INTRAVENOUS PRN
Status: DISCONTINUED | OUTPATIENT
Start: 2021-11-02 | End: 2021-11-02

## 2021-11-02 RX ORDER — METHOCARBAMOL 750 MG/1
750 TABLET, FILM COATED ORAL
Status: DISCONTINUED | OUTPATIENT
Start: 2021-11-02 | End: 2021-11-02 | Stop reason: HOSPADM

## 2021-11-02 RX ORDER — DEXAMETHASONE SODIUM PHOSPHATE 4 MG/ML
INJECTION, SOLUTION INTRA-ARTICULAR; INTRALESIONAL; INTRAMUSCULAR; INTRAVENOUS; SOFT TISSUE PRN
Status: DISCONTINUED | OUTPATIENT
Start: 2021-11-02 | End: 2021-11-02

## 2021-11-02 RX ORDER — LIDOCAINE HYDROCHLORIDE 20 MG/ML
INJECTION, SOLUTION INFILTRATION; PERINEURAL PRN
Status: DISCONTINUED | OUTPATIENT
Start: 2021-11-02 | End: 2021-11-02

## 2021-11-02 RX ORDER — ONDANSETRON 4 MG/1
4 TABLET, ORALLY DISINTEGRATING ORAL EVERY 30 MIN PRN
Status: DISCONTINUED | OUTPATIENT
Start: 2021-11-02 | End: 2021-11-02 | Stop reason: HOSPADM

## 2021-11-02 RX ORDER — AMOXICILLIN 250 MG
1-2 CAPSULE ORAL 2 TIMES DAILY
Qty: 10 TABLET | Refills: 0 | Status: SHIPPED | OUTPATIENT
Start: 2021-11-02

## 2021-11-02 RX ORDER — ONDANSETRON 2 MG/ML
4 INJECTION INTRAMUSCULAR; INTRAVENOUS EVERY 30 MIN PRN
Status: DISCONTINUED | OUTPATIENT
Start: 2021-11-02 | End: 2021-11-02

## 2021-11-02 RX ORDER — ACETAMINOPHEN 325 MG/1
975 TABLET ORAL ONCE
Status: DISCONTINUED | OUTPATIENT
Start: 2021-11-02 | End: 2021-11-02 | Stop reason: HOSPADM

## 2021-11-02 RX ORDER — CEFAZOLIN SODIUM 2 G/100ML
2 INJECTION, SOLUTION INTRAVENOUS SEE ADMIN INSTRUCTIONS
Status: DISCONTINUED | OUTPATIENT
Start: 2021-11-02 | End: 2021-11-02 | Stop reason: HOSPADM

## 2021-11-02 RX ORDER — ALBUTEROL SULFATE 0.83 MG/ML
2.5 SOLUTION RESPIRATORY (INHALATION) EVERY 4 HOURS PRN
Status: DISCONTINUED | OUTPATIENT
Start: 2021-11-02 | End: 2021-11-02 | Stop reason: HOSPADM

## 2021-11-02 RX ORDER — HYDROMORPHONE HYDROCHLORIDE 1 MG/ML
0.2 INJECTION, SOLUTION INTRAMUSCULAR; INTRAVENOUS; SUBCUTANEOUS EVERY 5 MIN PRN
Status: DISCONTINUED | OUTPATIENT
Start: 2021-11-02 | End: 2021-11-02

## 2021-11-02 RX ORDER — ONDANSETRON 2 MG/ML
INJECTION INTRAMUSCULAR; INTRAVENOUS PRN
Status: DISCONTINUED | OUTPATIENT
Start: 2021-11-02 | End: 2021-11-02

## 2021-11-02 RX ORDER — OXYCODONE HYDROCHLORIDE 5 MG/1
5 TABLET ORAL EVERY 4 HOURS PRN
Status: DISCONTINUED | OUTPATIENT
Start: 2021-11-02 | End: 2021-11-02

## 2021-11-02 RX ORDER — ONDANSETRON 2 MG/ML
4 INJECTION INTRAMUSCULAR; INTRAVENOUS EVERY 30 MIN PRN
Status: DISCONTINUED | OUTPATIENT
Start: 2021-11-02 | End: 2021-11-02 | Stop reason: HOSPADM

## 2021-11-02 RX ORDER — HYDROMORPHONE HYDROCHLORIDE 1 MG/ML
0.2 INJECTION, SOLUTION INTRAMUSCULAR; INTRAVENOUS; SUBCUTANEOUS EVERY 5 MIN PRN
Status: DISCONTINUED | OUTPATIENT
Start: 2021-11-02 | End: 2021-11-02 | Stop reason: HOSPADM

## 2021-11-02 RX ORDER — OXYCODONE HYDROCHLORIDE 5 MG/1
5-10 TABLET ORAL EVERY 4 HOURS PRN
Qty: 10 TABLET | Refills: 0 | Status: SHIPPED | OUTPATIENT
Start: 2021-11-02

## 2021-11-02 RX ORDER — MAGNESIUM HYDROXIDE 1200 MG/15ML
LIQUID ORAL PRN
Status: DISCONTINUED | OUTPATIENT
Start: 2021-11-02 | End: 2021-11-02 | Stop reason: HOSPADM

## 2021-11-02 RX ORDER — SODIUM CHLORIDE, SODIUM LACTATE, POTASSIUM CHLORIDE, CALCIUM CHLORIDE 600; 310; 30; 20 MG/100ML; MG/100ML; MG/100ML; MG/100ML
INJECTION, SOLUTION INTRAVENOUS CONTINUOUS
Status: DISCONTINUED | OUTPATIENT
Start: 2021-11-02 | End: 2021-11-02

## 2021-11-02 RX ORDER — OXYCODONE HYDROCHLORIDE 5 MG/1
5 TABLET ORAL EVERY 4 HOURS PRN
Status: DISCONTINUED | OUTPATIENT
Start: 2021-11-02 | End: 2021-11-02 | Stop reason: HOSPADM

## 2021-11-02 RX ORDER — SODIUM CHLORIDE, SODIUM LACTATE, POTASSIUM CHLORIDE, CALCIUM CHLORIDE 600; 310; 30; 20 MG/100ML; MG/100ML; MG/100ML; MG/100ML
INJECTION, SOLUTION INTRAVENOUS CONTINUOUS
Status: DISCONTINUED | OUTPATIENT
Start: 2021-11-02 | End: 2021-11-02 | Stop reason: HOSPADM

## 2021-11-02 RX ORDER — ACETAMINOPHEN 325 MG/1
975 TABLET ORAL ONCE
Status: COMPLETED | OUTPATIENT
Start: 2021-11-02 | End: 2021-11-02

## 2021-11-02 RX ORDER — MEPERIDINE HYDROCHLORIDE 25 MG/ML
12.5 INJECTION INTRAMUSCULAR; INTRAVENOUS; SUBCUTANEOUS
Status: DISCONTINUED | OUTPATIENT
Start: 2021-11-02 | End: 2021-11-02 | Stop reason: HOSPADM

## 2021-11-02 RX ORDER — OXYCODONE HYDROCHLORIDE 5 MG/1
5 TABLET ORAL
Status: DISCONTINUED | OUTPATIENT
Start: 2021-11-02 | End: 2021-11-02 | Stop reason: HOSPADM

## 2021-11-02 RX ORDER — LIDOCAINE 40 MG/G
CREAM TOPICAL
Status: DISCONTINUED | OUTPATIENT
Start: 2021-11-02 | End: 2021-11-02 | Stop reason: HOSPADM

## 2021-11-02 RX ORDER — FENTANYL CITRATE 50 UG/ML
50 INJECTION, SOLUTION INTRAMUSCULAR; INTRAVENOUS EVERY 5 MIN PRN
Status: DISCONTINUED | OUTPATIENT
Start: 2021-11-02 | End: 2021-11-02 | Stop reason: HOSPADM

## 2021-11-02 RX ORDER — ACETAMINOPHEN 325 MG/1
650 TABLET ORAL
Status: DISCONTINUED | OUTPATIENT
Start: 2021-11-02 | End: 2021-11-02 | Stop reason: HOSPADM

## 2021-11-02 RX ORDER — FENTANYL CITRATE 50 UG/ML
25 INJECTION, SOLUTION INTRAMUSCULAR; INTRAVENOUS EVERY 5 MIN PRN
Status: DISCONTINUED | OUTPATIENT
Start: 2021-11-02 | End: 2021-11-02

## 2021-11-02 RX ORDER — HALOPERIDOL 5 MG/ML
1 INJECTION INTRAMUSCULAR
Status: DISCONTINUED | OUTPATIENT
Start: 2021-11-02 | End: 2021-11-02 | Stop reason: HOSPADM

## 2021-11-02 RX ORDER — ACETAMINOPHEN 325 MG/1
650 TABLET ORAL EVERY 4 HOURS PRN
Qty: 50 TABLET | Refills: 0 | Status: SHIPPED | OUTPATIENT
Start: 2021-11-02

## 2021-11-02 RX ORDER — PROPOFOL 10 MG/ML
INJECTION, EMULSION INTRAVENOUS PRN
Status: DISCONTINUED | OUTPATIENT
Start: 2021-11-02 | End: 2021-11-02

## 2021-11-02 RX ORDER — CEFAZOLIN SODIUM 2 G/100ML
2 INJECTION, SOLUTION INTRAVENOUS
Status: COMPLETED | OUTPATIENT
Start: 2021-11-02 | End: 2021-11-02

## 2021-11-02 RX ADMIN — SUGAMMADEX 100 MG: 100 INJECTION, SOLUTION INTRAVENOUS at 10:27

## 2021-11-02 RX ADMIN — Medication 10 MG: at 09:48

## 2021-11-02 RX ADMIN — ONDANSETRON 4 MG: 2 INJECTION INTRAMUSCULAR; INTRAVENOUS at 09:52

## 2021-11-02 RX ADMIN — DEXAMETHASONE SODIUM PHOSPHATE 4 MG: 4 INJECTION, SOLUTION INTRAMUSCULAR; INTRAVENOUS at 09:09

## 2021-11-02 RX ADMIN — PROPOFOL 200 MG: 10 INJECTION, EMULSION INTRAVENOUS at 08:53

## 2021-11-02 RX ADMIN — SODIUM CHLORIDE, POTASSIUM CHLORIDE, SODIUM LACTATE AND CALCIUM CHLORIDE: 600; 310; 30; 20 INJECTION, SOLUTION INTRAVENOUS at 08:42

## 2021-11-02 RX ADMIN — Medication 10 MG: at 09:51

## 2021-11-02 RX ADMIN — FENTANYL CITRATE 100 MCG: 50 INJECTION, SOLUTION INTRAMUSCULAR; INTRAVENOUS at 08:50

## 2021-11-02 RX ADMIN — SUGAMMADEX 200 MG: 100 INJECTION, SOLUTION INTRAVENOUS at 10:13

## 2021-11-02 RX ADMIN — LIDOCAINE HYDROCHLORIDE 100 MG: 20 INJECTION, SOLUTION INFILTRATION; PERINEURAL at 08:51

## 2021-11-02 RX ADMIN — CEFAZOLIN SODIUM 2 G: 2 INJECTION, SOLUTION INTRAVENOUS at 09:42

## 2021-11-02 RX ADMIN — HYDROMORPHONE HYDROCHLORIDE 0.5 MG: 1 INJECTION, SOLUTION INTRAMUSCULAR; INTRAVENOUS; SUBCUTANEOUS at 09:23

## 2021-11-02 RX ADMIN — ROCURONIUM BROMIDE 50 MG: 50 INJECTION, SOLUTION INTRAVENOUS at 08:54

## 2021-11-02 RX ADMIN — ACETAMINOPHEN 975 MG: 325 TABLET, FILM COATED ORAL at 06:55

## 2021-11-02 ASSESSMENT — MIFFLIN-ST. JEOR: SCORE: 1790

## 2021-11-02 NOTE — OP NOTE
Orthopedic Surgery Operative Note      Medical Record Number: [id number]     YOB: 1975     Surgery Date:  11/2/2021     Primary Surgeon:  Dr. Willis     Assistants: Jc Cardona, PGY4     Pre-operative Diagnosis:      1. Left iliac wing lytic lesion     Post-operative Diagnosis:      1. Left iliac wing lytic lesion     Procedure:    1. Left iliac wing bone biopsy  2. Left posterior iliac crest curettage and allograft bone packing 60 ml.     Anesthesia: General Endotracheal Anesthesia      EBL:  10mL     Fluid:  see anesthesia report       Specimens:    1. Left iliac wing lytic lesion aspiration for cultures  2. Left iliac wing bone lytic lesion cultures for pathology     Complications: none     Findings:    1. Left iliac wing lytic lesion w/ serosanguinous clear fluid present.     Implants:   Cancellous bone graft 70cc.        SURGICAL INDICATIONS:  This is an 46 year old-year-old male who unfortunately presented with an evolving lytic lesion at the iliac wing that was evolving on imaging.  The patient received medical evaluation as well as evaluation by the orthopedic surgery service.  When the patient was cleared from a medical standpoint for surgical intervention we proceeded  to the operating room.  We discussed the risks / benefits and alternatives.  The risks include infection, neurovascular injury, cardiovascular injury, blood loss, transfusion,blood clot, wound issues, weakness / limp, repeat surgery, and even death.      DESCRIPTION OF PROCEDURE: The patient was identified in the preoperative holding area where the correct operative site was marked.  Consent was signed and verified. The patient  was wheeled to the operating theater and induced under anesthesia.  The patient was positioned on the radiolucent table in the prone position. All bony prominences were padded.      At that point, we prepped and draped the left iliac wing.  A time-out was performed per hospital protocol.  We used  imaging to guide to confirm the placement of our incision over the left iliac wing lytic lesion. A 5 cm incision was made sharply over the left medial iliac wing over the lytic lesion. Deeper dissection was carried down to bone using electrocautery through the subcutaneous tissue. Periosteum was incised and elevated using electrocautery. Cortex over the lytic lesion was easily penetrated using a knee needle. A bone window measuring approximately 1 cm x 1 cm was made. Aspiration of the serosanguineous fluid in the cavity were obtained and sent for cultures. Additional cultures from the membrane and bony borders of the cavity were obtained for pathology.    A curettage was now performed.  Straight and angled curettes were used to remove all lining or other tissue from within the cyst cavity.   The cavity was then well irrigated. Approximately 60 cc of cancellous bone graft was impacted firmly into the cavity. Wound was then irrigated with sterile saline.    Wound was copiously irrigated and the wounds were closed in layers (0-vicryl for fascia, 2-0 for subcutaneous and 3-0 PDS for skin).  Sterile dressings were applied in the form of exofin and alginate with tegaderm.  The patient was safely transferred to the \[facility]\"" and extubated in the standard fashion.     All counts were correct.  Dr. Willis was scrubbed and/or immediately available for all key and critical portions of the procedure.  There were no complications noted at the conclusion of the case.     PLAN:  Patient will be discharged from PACU after patient meets PACU criteria  Patient will be weightbearing as tolerated  Range of motion as tolerated in the lower extremities  Dressing will stay in place until patient follows up in clinic  Patient is okay to shower after postop day 2 and allow water to run over the dressing  Pain medications were prescribed  Patient will follow up with Dr. Willis as previously scheduled at 2 weeks post-op     Jc Cardona  PGY-4  Orthopedic Surgery Resident    Attending MD (Dr. Bob Willis) Attestation:  I was present during the key portions of the procedure and I was immediately available for the entire procedure between opening and closing.    MD Eden Garcia Family Professor  Oncology and Adult Reconstructive Surgery  Dept Orthopaedic Surgery, [facility]

## 2021-11-02 NOTE — ANESTHESIA POSTPROCEDURE EVALUATION
Patient: Hill Archuleta    Procedure: Procedure(s):  Biopsy Left posterior iliac wing lesion,with curettage and allograft bone packing       Diagnosis:Bone lesion [M89.9]  Diagnosis Additional Information: No value filed.    Anesthesia Type:  General    Note:  Disposition: Outpatient   Postop Pain Control: Uneventful            Sign Out: Well controlled pain   PONV: No   Neuro/Psych: Uneventful            Sign Out: Acceptable/Baseline neuro status   Airway/Respiratory: Uneventful            Sign Out: Acceptable/Baseline resp. status   CV/Hemodynamics: Uneventful            Sign Out: Acceptable CV status; No obvious hypovolemia; No obvious fluid overload   Other NRE: NONE   DID A NON-ROUTINE EVENT OCCUR? No           Last vitals:  Vitals Value Taken Time   /79 11/02/21 1145   Temp 36.4  C (97.6  F) 11/02/21 1130   Pulse 62 11/02/21 1149   Resp 12 11/02/21 1150   SpO2 99 % 11/02/21 1151   Vitals shown include unvalidated device data.    Electronically Signed By: Raffy Fairbanks MD  November 2, 2021  1:03 PM

## 2021-11-02 NOTE — ANESTHESIA PROCEDURE NOTES
Airway       Patient location during procedure: OR       Procedure Start/Stop Times: 11/2/2021 8:59 AM  Staff -        Anesthesiologist:  Raffy Fairbanks MD       Resident/Fellow: Goldberg, Leslie, MD       Performed By: resident  Consent for Airway        Urgency: elective  Indications and Patient Condition       Indications for airway management: ann-marie-procedural       Induction type:intravenous       Mask difficulty assessment: 1 - vent by mask    Final Airway Details       Final airway type: endotracheal airway       Successful airway: ETT - single  Endotracheal Airway Details        ETT size (mm): 7.5       Cuffed: yes       Cuff volume (mL): 10       Successful intubation technique: direct laryngoscopy       DL Blade Type: MAC 4       Grade View of Cords: 1       Adjucts: stylet       Position: Right       Measured from: gums/teeth       Secured at (cm): 22       Bite block used: Soft    Post intubation assessment        Number of attempts at approach: 1       Number of other approaches attempted: 0       Secured with: pink tape       Ease of procedure: easy       Dentition: Intact and Unchanged

## 2021-11-02 NOTE — DISCHARGE INSTRUCTIONS
Caring for Your Incision    You ll need to care for your incision after surgery and certain medical procedures. To close an incision, your doctor used sutures (stitches), steri-strips, staples, or dermabond. Follow the tips on this sheet to help heal and prevent infection of your incision.   Types of Incision Closure:    Surgical Sutures (stitches) are placed by sewing the edges of an incision together with surgical thread. Sutures are either absorbable or non-absorbable. Absorbable sutures break down in the body over time. Non-absorbable sutures need to be removed.     Steri-strips are made of adhesive (sticky) material to help hold the edges of an incision together. Steri-strips usually fall off by themselves in 7 to 10 days.     Surgical Staples are made or steel or titanium. They are often used to close shallow incisions. They are not used on certain body areas, such as the face and hands. This is because these areas have nerves that are close to the surface. Staples are usually removed within a week.     Dermabond (skin glue) is used to close a cut or small incision. The skin glue is less painful than stitches (sutures). In some cases, a lower layer of skin may be sutured before Dermabond is applied. The skin glue closes the cut/incision within a few minutes. It also provides a water-resistant covering. No bandage is required. Dermabond peels off on its own within 5 to 10 days.  Home Care for Your  Incision:    Keep the incision clean and dry. You should bathe only as directed by the doctor. It is okay to wash around the incision, but don t spray water directly on it.     Check the incision site daily for pain, redness, drainage, swelling, or separation of the incision edges.     If you have a dressing over the incision, change the dressing as directed by the doctor.    Make sure any clothing that touches the incision is loose fitting. This will prevent rubbing. If the incision is on the head, avoid wearing  caps or other head coverings. These may rub against the incision.    Avoid rough play, contact sports, or physical activities. This can put you at risk of opening an incision.     As your incision heals, the skin may appear pink or red. It may also feel slightly bumpy or raised. This is called a healing ridge. Over time, the color should fade and the raised skin will become less noticeable.   Call the doctor right away if you have any of the following:    Increased pain, redness, drainage, swelling or bleeding at the incision site    Numbness, coldness or tingling around the incision site    Fever of 101 F degrees or higher  Rev. 4/2014  Same-Day Surgery   Adult Discharge Orders & Instructions     For 24 hours after surgery:  1. Get plenty of rest.  A responsible adult must stay with you for at least 24 hours after you leave the hospital.   2. Pain medication can slow your reflexes. Do not drive or use heavy equipment.  If you have weakness or tingling, don't drive or use heavy equipment until this feeling goes away.  3. Mixing alcohol and pain medication can cause dizziness and slow your breathing. It can even be fatal. Do not drink alcohol while taking pain medication.  4. Avoid strenuous or risky activities.  Ask for help when climbing stairs.   5. You may feel lightheaded.  If so, sit for a few minutes before standing.  Have someone help you get up.   6. If you have nausea (feel sick to your stomach), drink only clear liquids such as apple juice, ginger ale, broth or 7-Up.  Rest may also help.  Be sure to drink enough fluids.  Move to a regular diet as you feel able. Take pain medications with a small amount of solid food, such as toast or crackers, to avoid nausea.   7. A slight fever is normal. Call the doctor if your fever is over 100 F (37.7 C) (taken under the tongue) or lasts longer than 24 hours.  8. You may have a dry mouth, muscle aches, trouble sleeping or a sore throat.  These symptoms should go away  after 24 hours.  9. Do not make important or legal decisions.   Pain Management:      1. Take pain medication (if prescribed) for pain as directed by your physician.        2. WARNING: If the pain medication you have been prescribed contains Tylenol  (acetaminophen), DO NOT take additional doses of Tylenol (acetaminophen).     Call your doctor for any of the followin.  Signs of infection (fever, growing tenderness at the surgery site, severe pain, a large amount of drainage or bleeding, foul-smelling drainage, redness, swelling).    2.  It has been over 8 to 10 hours since surgery and you are still not able to urinate (pee).    3.  Headache for over 24 hours.    4.  Numbness, tingling or weakness the day after surgery (if you had spinal anesthesia).  To contact a doctor, call Dr. OSMAN Willis, Orthopedic, [phone]   or:      [phone] and ask for the Resident On Call for:          Orthopedic Surgery (answered 24 hours a day)      Emergency Department:  [place] [facility]: [phone]  [place] [facility]: [phone]               Rev. 10/2014

## 2021-11-02 NOTE — ANESTHESIA CARE TRANSFER NOTE
Patient: Hill Archuleta    Procedure: Procedure(s):  Biopsy Left posterior iliac wing lesion,with curettage and allograft bone packing       Diagnosis: Bone lesion [M89.9]  Diagnosis Additional Information: No value filed.    Anesthesia Type:   General     Note:    Oropharynx: oropharynx clear of all foreign objects and spontaneously breathing  Level of Consciousness: awake  Oxygen Supplementation: face mask  Level of Supplemental Oxygen (L/min / FiO2): 4  Independent Airway: airway patency satisfactory and stable  Dentition: dentition unchanged  Vital Signs Stable: post-procedure vital signs reviewed and stable  Report to RN Given: handoff report given  Patient transferred to: PACU    Handoff Report: Identifed the Patient, Identified the Reponsible Provider, Reviewed the pertinent medical history, Discussed the surgical course, Reviewed Intra-OP anesthesia mangement and issues during anesthesia, Set expectations for post-procedure period and Allowed opportunity for questions and acknowledgement of understanding      Vitals:  Vitals Value Taken Time   /81 11/02/21 1036   Temp     Pulse 86 11/02/21 1041   Resp 10 11/02/21 1041   SpO2 100 % 11/02/21 1041   Vitals shown include unvalidated device data.    Electronically Signed By: Leslie Goldberg, MD  November 2, 2021  10:43 AM

## 2021-11-07 LAB — BACTERIA FLD CULT: NO GROWTH

## 2021-11-09 LAB — BACTERIA FLD CULT: NORMAL

## 2021-11-14 LAB
PATH REPORT.COMMENTS IMP SPEC: NORMAL
PATH REPORT.COMMENTS IMP SPEC: NORMAL
PATH REPORT.FINAL DX SPEC: NORMAL
PATH REPORT.GROSS SPEC: NORMAL
PATH REPORT.MICROSCOPIC SPEC OTHER STN: NORMAL
PATH REPORT.RELEVANT HX SPEC: NORMAL
PHOTO IMAGE: NORMAL

## 2021-11-18 ENCOUNTER — VIRTUAL VISIT (OUTPATIENT)
Dept: ORTHOPEDICS | Facility: CLINIC | Age: 46
End: 2021-11-18
Payer: COMMERCIAL

## 2021-11-18 DIAGNOSIS — M85.40 BONE CYST, SOLITARY: Primary | ICD-10-CM

## 2021-11-18 PROCEDURE — 99024 POSTOP FOLLOW-UP VISIT: CPT | Mod: 95 | Performed by: ORTHOPAEDIC SURGERY

## 2021-11-18 NOTE — LETTER
11/18/2021         RE: Hill Archuleta  [address]   [place] [facility] [place] [address]        Dear Colleague,    Thank you for referring your patient, Hill Archuleta, to the [facility] [place]. Please see a copy of my visit note below.         [facility], Orthopaedic Oncology Surgery Consultation  by Bob Willis M.D.    Hill Archuleta MRN# [id number]   Age: 45 year old YOB: 1975     Requesting physician: No ref. provider found  AleLaz         DX:  Benign bone cyst L iliac crest      SURGERY:  11/2/2021, Biopsy, curettage and excision of cystic lesion L posterior iliac crest with allograft bone packing.  (Lazaro) [facility]    I had a virtual follow-up visit with Dr. Hill Archuleta.  Patient reports that his surgical site is healing well without any incision or wound complication.  He is ambulating normally and has no limp.  He has returned to the gym in the past several days for training activity.    I reviewed his pathology report which revealed no evidence of neoplasm and normal trilineage bone marrow elements.  Culture results were negative as well.    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    Plan:  Return to clinic for virtual follow-up visit in 6 months with CT scan of pelvis before hand to assess bone graft incorporation and any cyst recurrence.  I did discuss with Dr. Archuleta the high percentage of cases in which the cyst does recur despite the bone grafting procedure.    MD Eden Garcia Family Professor  Oncology and Adult Reconstructive Surgery  Dept Orthopaedic Surgery, [facility]  [phone] office, [phone] pager  [email or web address]

## 2021-11-18 NOTE — PROGRESS NOTES
[facility], Orthopaedic Oncology Surgery Consultation  by Bob Willis M.D.    Hill SMITHA Archuleta MRN# [id number]   Age: 45 year old YOB: 1975     Requesting physician: No ref. provider found  Laz Aguilera         DX:  Benign bone cyst L iliac crest      SURGERY:  11/2/2021, Biopsy, curettage and excision of cystic lesion L posterior iliac crest with allograft bone packing.  (Lazaro) [facility]    I had a virtual follow-up visit with Dr. Hill Archuleta.  Patient reports that his surgical site is healing well without any incision or wound complication.  He is ambulating normally and has no limp.  He has returned to the gym in the past several days for training activity.    I reviewed his pathology report which revealed no evidence of neoplasm and normal trilineage bone marrow elements.  Culture results were negative as well.    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    Plan:  Return to clinic for virtual follow-up visit in 6 months with CT scan of pelvis before hand to assess bone graft incorporation and any cyst recurrence.  I did discuss with Dr. Archuleta the high percentage of cases in which the cyst does recur despite the bone grafting procedure.    MD Eden Garcia Family Professor  Oncology and Adult Reconstructive Surgery  Dept Orthopaedic Surgery, [facility]  [phone] office, [phone] pager  [email or web address]

## 2022-04-07 ENCOUNTER — TELEPHONE (OUTPATIENT)
Dept: ORTHOPEDICS | Facility: CLINIC | Age: 47
End: 2022-04-07
Payer: COMMERCIAL

## 2022-04-07 DIAGNOSIS — M85.40 BONE CYST, SOLITARY: Primary | ICD-10-CM

## 2022-04-08 ENCOUNTER — TELEPHONE (OUTPATIENT)
Dept: ORTHOPEDICS | Facility: CLINIC | Age: 47
End: 2022-04-08
Payer: COMMERCIAL

## 2022-04-28 ENCOUNTER — TELEPHONE (OUTPATIENT)
Dept: ORTHOPEDICS | Facility: CLINIC | Age: 47
End: 2022-04-28
Payer: COMMERCIAL

## 2022-04-28 NOTE — TELEPHONE ENCOUNTER
----- Message from Anju Iglesias RN sent at 2021  2:59 PM CST -----  Regardin month follow up  Put CT scan in- help patient get it scheduled and scheduled virtual visit        Plan:  Return to clinic for virtual follow-up visit in 6 months with CT scan of pelvis before hand to assess bone graft incorporation and any cyst recurrence.

## 2022-04-28 NOTE — TELEPHONE ENCOUNTER
ATC called and spoke to pt. [facility]. ATC called [phone] and faxed CT orders to [phone] with completed 'orders form'.       Rightfax marked as received/sent.     Elena Garrett ATC

## 2022-06-04 ENCOUNTER — HEALTH MAINTENANCE LETTER (OUTPATIENT)
Age: 47
End: 2022-06-04

## 2022-06-09 ENCOUNTER — ANCILLARY PROCEDURE (OUTPATIENT)
Dept: CT IMAGING | Facility: CLINIC | Age: 47
End: 2022-06-09
Attending: ORTHOPAEDIC SURGERY
Payer: COMMERCIAL

## 2022-06-09 DIAGNOSIS — M85.40 BONE CYST, SOLITARY: ICD-10-CM

## 2022-06-09 PROCEDURE — 72192 CT PELVIS W/O DYE: CPT

## 2022-06-16 ENCOUNTER — VIRTUAL VISIT (OUTPATIENT)
Dept: ORTHOPEDICS | Facility: CLINIC | Age: 47
End: 2022-06-16
Payer: COMMERCIAL

## 2022-06-16 DIAGNOSIS — M89.9 BONE LESION: Primary | ICD-10-CM

## 2022-06-16 PROCEDURE — 99213 OFFICE O/P EST LOW 20 MIN: CPT | Mod: GT | Performed by: ORTHOPAEDIC SURGERY

## 2022-06-16 NOTE — PROGRESS NOTES
[facility], Orthopaedic Oncology Surgery Consultation  by Bob Willis M.D.    Hill SMITHA Archuleta MRN# [id number]   Age: 45 year old YOB: 1975     Requesting physician: No ref. provider found  Laz Aguilera         DX:  Benign bone cyst L iliac crest      SURGERY:  11/2/2021, Biopsy, curettage and excision of cystic lesion L posterior iliac crest with allograft bone packing.  (Lazaro) [facility]    I had a virtual follow-up visit with Dr. Hill Archuleta.  Patient reports that his surgical site is healing well without any incision or wound complication.  He is ambulating normally and has no limp.  He has returned to the gym in the past several days for training activity.    I reviewed his pathology report which revealed no evidence of neoplasm and normal trilineage bone marrow elements.  Culture results were negative as well.    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    Plan:  Return to clinic for virtual follow-up visit in 6 months with CT scan of pelvis before hand to assess bone graft incorporation and any cyst recurrence.  I did discuss with Dr. Archuleta the high percentage of cases in which the cyst does recur despite the bone grafting procedure.    MD Eden Garcia Family Professor  Oncology and Adult Reconstructive Surgery  Dept Orthopaedic Surgery, [facility]  [phone] office, [phone] pager  [email or web address]

## 2022-06-16 NOTE — PROGRESS NOTES
[facility], Orthopaedic Oncology Surgery Consultation  by Bob Willis M.D.    Hill Archuleta MRN# [id number]    YOB: 1975     Requesting physician: No ref. provider found  Ale Laz Collins         DX:  Benign bone cyst L iliac crest      SURGERY:  11/2/2021, Biopsy, curettage and excision of cystic lesion L posterior iliac crest with allograft bone packing.  (Lazaro) [facility]      I had a virtual follow-up visit with Dr. Hill Archuleta.  Patient reports that he is completely asymptomatic.    He is ambulating normally and has no limp.      I again reviewed his pathology report which revealed no evidence of neoplasm and normal trilineage bone marrow elements.  Culture results were negative as well.    CT scan was also reviewed and demonstrates excellent remodeling and incorporation of the previously placed bone graft.  There is no evidence of progressive neoplastic growth or soft tissue mass or cortical destruction.  Small voids in the area where bone graft is not completely filled and are noted.    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  satisfactory bone allograft remodeling.    Plan:  Activity ad justice.  No further follow-up surveillance visits needed.  Patient will contact us if any problems arise.    MD Eden Garcia Family Professor  Oncology and Adult Reconstructive Surgery  Dept Orthopaedic Surgery, [facility]  [phone] office, [phone] pager  [email or web address]    Virtual-Visit Details    Type of service:  Video/telephone Visit  Video/telephone total duration (including visit time, pre and post visit work time as documented above on the same day of service): 20    Visit start time: 1700  Visit end time:1720  Originating Location (pt. Location): Home  Distant Location (provider location):  [facility]   Platform used  for Virtual Visit: Maddie

## 2022-06-16 NOTE — NURSING NOTE
Chief Complaint   Patient presents with     RECHECK     Follow-up on CT scan        46 year old  1975    There were no vitals taken for this visit.           Pain Assessment  Patient Currently in Pain: No                              [id number] - [place], [place] - [address] [id number] - [place], [place] - [address] [facility] & [address] - [facility] [address]  [facility] #[id number] - [place], [place] - [address] AT [address] [place] [address]        No Known Allergies        Current Outpatient Medications   Medication     acetaminophen (TYLENOL) 325 MG tablet     NO ACTIVE MEDICATIONS     oxyCODONE (ROXICODONE) 5 MG tablet     senna-docusate (SENOKOT-S/PERICOLACE) 8.6-50 MG tablet     No current facility-administered medications for this visit.             Questionnaires:    HOOS Hip Dysfunction & Osteoarthritis Outcome Questionnaire    No flowsheet data found.           KOOS Knee Survey Assessment    Knee Outcome Survey ADL Scale (ISAIAS Perez; Cande, L; Shelley, RS; Claudy, FH; Naveen, CD; 1998) 10/23/2014   Pain (ADLS1) 2   Stiffness (ADLS2) 3   Swelling (ADLS3) 4   Giving Way, Buckling or Shifting of Knee (ADLS4) 5   Weakness (ADLS5) 5   Limping (ADLS6) 5   Walk? (ADLS7) 5   Go up stairs? (ADLS8) 5   Go down stairs? (ADLS9) 5   Stand? (ADLS10) 5   Kneel on the front of your knee? (ADLS11) 5   Squat? (ADLS12) 5   Sit with your knee bent? (ADLS13) 5   How would you rate the current function of your knee during your usual daily activities on a scale from 0 to 100 with 100 being your level of knee function prior to your injury and 0 being the inability to perform any of your usual daily activities? 3   How would you rate the overall function of your knee during your usual daily activities?  (please check the one response that best describes you) 3   As a result of your  knee injury, how would you rate your current level of daily activity? (please check the one response that best describes you) -1   Sum 64   Count 14   Raw Score 64   Knee Activity of Daily Living Score 91.43              Promis 10 Assessment    PROMIS 10 6/16/2022   In general, would you say your health is: Excellent   In general, would you say your quality of life is: Excellent   In general, how would you rate your physical health? Excellent   In general, how would you rate your mental health, including your mood and your ability to think? Excellent   In general, how would you rate your satisfaction with your social activities and relationships? Excellent   In general, please rate how well you carry out your usual social activities and roles Excellent   To what extent are you able to carry out your everyday physical activities such as walking, climbing stairs, carrying groceries, or moving a chair? Completely   How often have you been bothered by emotional problems such as feeling anxious, depressed or irritable? Never   How would you rate your fatigue on average? None   How would you rate your pain on average?   0 = No Pain  to  10 = Worst Imaginable Pain 0   In general, would you say your health is: 5   In general, would you say your quality of life is: 5   In general, how would you rate your physical health? 5   In general, how would you rate your mental health, including your mood and your ability to think? 5   In general, how would you rate your satisfaction with your social activities and relationships? 5   In general, please rate how well you carry out your usual social activities and roles. (This includes activities at home, at work and in your community, and responsibilities as a parent, child, spouse, employee, friend, etc.) 5   To what extent are you able to carry out your everyday physical activities such as walking, climbing stairs, carrying groceries, or moving a chair? 5   In the past 7 days, how  often have you been bothered by emotional problems such as feeling anxious, depressed, or irritable? 1   In the past 7 days, how would you rate your fatigue on average? 1   In the past 7 days, how would you rate your pain on average, where 0 means no pain, and 10 means worst imaginable pain? 0   Global Mental Health Score 20   Global Physical Health Score 20   PROMIS TOTAL - SUBSCORES 40   Some recent data might be hidden              Ortho [place] Knee Questionnaire    No flowsheet data found.

## 2022-06-16 NOTE — LETTER
6/16/2022         RE: Hill Archuleta  [address]   [place] [address]        Dear Colleague,    Thank you for referring your patient, Hill Archuleta, to the [facility] [place]. Please see a copy of my visit note below.         [facility], Orthopaedic Oncology Surgery Consultation  by Bob Willis M.D.    Hill Archuleta MRN# [id number]    YOB: 1975     Requesting physician: No ref. provider found  Laz Aguilera         DX:  Benign bone cyst L iliac crest      SURGERY:  11/2/2021, Biopsy, curettage and excision of cystic lesion L posterior iliac crest with allograft bone packing.  (Lazaro) [facility]      I had a virtual follow-up visit with Dr. Hill Archuleta.  Patient reports that he is completely asymptomatic.    He is ambulating normally and has no limp.      I again reviewed his pathology report which revealed no evidence of neoplasm and normal trilineage bone marrow elements.  Culture results were negative as well.    CT scan was also reviewed and demonstrates excellent remodeling and incorporation of the previously placed bone graft.  There is no evidence of progressive neoplastic growth or soft tissue mass or cortical destruction.  Small voids in the area where bone graft is not completely filled and are noted.    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  satisfactory bone allograft remodeling.    Plan:  Activity ad justice.  No further follow-up surveillance visits needed.  Patient will contact us if any problems arise.    MD Eden Garcia Family Professor  Oncology and Adult Reconstructive Surgery  Dept Orthopaedic Surgery, [facility]  [phone] office, [phone] pager  [email or web address]    Virtual-Visit Details    Type of service:  Video/telephone Visit  Video/telephone total duration (including visit time, pre  and post visit work time as documented above on the same day of service): 20    Visit start time: 1700  Visit end time:1720  Originating Location (pt. Location): Home  Distant Location (provider location):  [facility]   Platform used for Virtual Visit: myMatrixx

## 2022-10-09 ENCOUNTER — HEALTH MAINTENANCE LETTER (OUTPATIENT)
Age: 47
End: 2022-10-09

## 2023-05-21 ENCOUNTER — HEALTH MAINTENANCE LETTER (OUTPATIENT)
Age: 48
End: 2023-05-21

## 2024-05-25 ENCOUNTER — HEALTH MAINTENANCE LETTER (OUTPATIENT)
Age: 49
End: 2024-05-25

## 2025-06-14 ENCOUNTER — HEALTH MAINTENANCE LETTER (OUTPATIENT)
Age: 50
End: 2025-06-14

## (undated) DEVICE — GLOVE PROTEXIS BLUE W/NEU-THERA 8.0  2D73EB80

## (undated) DEVICE — DRSG GAUZE 4X8" NON21842

## (undated) DEVICE — PACK TOTAL HIP W/U DRAPE RIVERSIDE LATEX FREE

## (undated) DEVICE — GLOVE PROTEXIS BLUE W/NEU-THERA 7.5  2D73EB75

## (undated) DEVICE — SYR EAR BULB 3OZ 0035830

## (undated) DEVICE — SU VICRYL 2-0 CT-1 27" UND J259H

## (undated) DEVICE — SOL WATER IRRIG 1000ML BOTTLE 2F7114

## (undated) DEVICE — SU CHROMIC 1 CTX 36" 905H

## (undated) DEVICE — DRSG TEGADERM ALGINATE AG 4X5" 90303

## (undated) DEVICE — DRSG TELFA 3X8" 1238

## (undated) DEVICE — GLOVE PROTEXIS W/NEU-THERA 7.0  2D73TE70

## (undated) DEVICE — LINEN ORTHO PACK 5446

## (undated) DEVICE — PREP DURAPREP REMOVER 4OZ 8611

## (undated) DEVICE — GOWN XLG DISP 9545

## (undated) DEVICE — TAPE MICROFOAM 4" 1528-4

## (undated) DEVICE — SU PDS II 3-0 PS-1 18" Z683G

## (undated) DEVICE — GLOVE PROTEXIS W/NEU-THERA 7.5  2D73TE75

## (undated) DEVICE — DRAPE C-ARM W/STRAPS 42X72" 07-CA104

## (undated) DEVICE — LINEN GOWN OVERSIZE 5408

## (undated) DEVICE — SYR 10ML FINGER CONTROL W/O NDL 309695

## (undated) DEVICE — DRSG STERI STRIP 1/2X4" R1547

## (undated) DEVICE — BLADE CLIPPER SGL USE 9680

## (undated) DEVICE — SOL NACL 0.9% IRRIG 1000ML BOTTLE 2F7124

## (undated) DEVICE — DRSG ADAPTIC 3X8" 6113

## (undated) DEVICE — ADH SKIN CLOSURE PREMIERPRO EXOFIN 1.0ML 3470

## (undated) DEVICE — SUTURE

## (undated) DEVICE — SPECIMEN CONTAINER URINE 90ML STERILE 75.1435.002

## (undated) DEVICE — DRSG TEGADERM 4X4 3/4" 1626W

## (undated) DEVICE — STRAP KNEE/BODY 31143004

## (undated) DEVICE — LINEN TOWEL PACK X5 5464

## (undated) DEVICE — SUCTION MANIFOLD NEPTUNE 2 SYS 4 PORT 0702-020-000

## (undated) RX ORDER — ACETAMINOPHEN 325 MG/1
TABLET ORAL
Status: DISPENSED
Start: 2021-11-02

## (undated) RX ORDER — FENTANYL CITRATE 50 UG/ML
INJECTION, SOLUTION INTRAMUSCULAR; INTRAVENOUS
Status: DISPENSED
Start: 2021-11-02

## (undated) RX ORDER — BUPIVACAINE HYDROCHLORIDE 2.5 MG/ML
INJECTION, SOLUTION EPIDURAL; INFILTRATION; INTRACAUDAL
Status: DISPENSED
Start: 2021-11-02

## (undated) RX ORDER — CEFAZOLIN SODIUM 2 G/100ML
INJECTION, SOLUTION INTRAVENOUS
Status: DISPENSED
Start: 2021-11-02

## (undated) RX ORDER — HYDROMORPHONE HYDROCHLORIDE 1 MG/ML
INJECTION, SOLUTION INTRAMUSCULAR; INTRAVENOUS; SUBCUTANEOUS
Status: DISPENSED
Start: 2021-11-02